# Patient Record
Sex: MALE | Race: WHITE | NOT HISPANIC OR LATINO | Employment: OTHER | URBAN - METROPOLITAN AREA
[De-identification: names, ages, dates, MRNs, and addresses within clinical notes are randomized per-mention and may not be internally consistent; named-entity substitution may affect disease eponyms.]

---

## 2019-03-26 PROBLEM — R31.29 MICROHEMATURIA: Status: ACTIVE | Noted: 2019-03-26

## 2019-03-26 PROBLEM — N20.1 URETERAL STONE: Status: ACTIVE | Noted: 2019-03-26

## 2019-04-02 PROBLEM — Z46.6 FITTING AND ADJUSTMENT OF URINARY DEVICE: Status: ACTIVE | Noted: 2019-04-02

## 2019-04-02 PROBLEM — Z87.442 PERSONAL HISTORY OF URINARY CALCULI: Status: ACTIVE | Noted: 2019-04-02

## 2019-05-18 ENCOUNTER — OFFICE VISIT (OUTPATIENT)
Dept: URGENT CARE | Facility: CLINIC | Age: 74
End: 2019-05-18
Payer: MEDICARE

## 2019-05-18 VITALS
BODY MASS INDEX: 23.95 KG/M2 | OXYGEN SATURATION: 97 % | HEART RATE: 57 BPM | WEIGHT: 158 LBS | DIASTOLIC BLOOD PRESSURE: 89 MMHG | HEIGHT: 68 IN | SYSTOLIC BLOOD PRESSURE: 154 MMHG | TEMPERATURE: 97 F

## 2019-05-18 DIAGNOSIS — J02.9 ACUTE PHARYNGITIS, UNSPECIFIED ETIOLOGY: Primary | ICD-10-CM

## 2019-05-18 PROCEDURE — 96372 THER/PROPH/DIAG INJ SC/IM: CPT | Mod: S$GLB,,, | Performed by: FAMILY MEDICINE

## 2019-05-18 PROCEDURE — 99203 OFFICE O/P NEW LOW 30 MIN: CPT | Mod: 25,S$GLB,, | Performed by: FAMILY MEDICINE

## 2019-05-18 PROCEDURE — 99203 PR OFFICE/OUTPT VISIT, NEW, LEVL III, 30-44 MIN: ICD-10-PCS | Mod: 25,S$GLB,, | Performed by: FAMILY MEDICINE

## 2019-05-18 PROCEDURE — 96372 PR INJECTION,THERAP/PROPH/DIAG2ST, IM OR SUBCUT: ICD-10-PCS | Mod: S$GLB,,, | Performed by: FAMILY MEDICINE

## 2019-05-18 RX ORDER — CEFDINIR 300 MG/1
300 CAPSULE ORAL 2 TIMES DAILY
Qty: 20 CAPSULE | Refills: 0 | Status: SHIPPED | OUTPATIENT
Start: 2019-05-18 | End: 2019-05-28

## 2019-05-18 RX ORDER — DEXAMETHASONE SODIUM PHOSPHATE 100 MG/10ML
5 INJECTION INTRAMUSCULAR; INTRAVENOUS
Status: COMPLETED | OUTPATIENT
Start: 2019-05-18 | End: 2019-05-18

## 2019-05-18 RX ADMIN — DEXAMETHASONE SODIUM PHOSPHATE 5 MG: 100 INJECTION INTRAMUSCULAR; INTRAVENOUS at 06:05

## 2019-05-18 NOTE — PATIENT INSTRUCTIONS
You have requested an injection of steroid today.  You have Diabetes Mellitus.  I informed you that the injection can and probably will raise your blood sugar.  You stated that you have had this type of injection before without problems and requested the injection.  You should monitor your blood sugar closely (at least 2 - 3 times per day) for the next few days.    Please drink plenty of fluids.  Please get plenty of rest.  Please return here or go to the Emergency Department for any concerns or worsening of condition.  If you were given wait & see antibiotics, please wait 3-5 days before taking them, and only take them if your symptoms have worsened or not improved.  If you do begin taking the antibiotics, please take them to completion.  If you were prescribed antibiotics, please take them to completion.    If you were prescribed a narcotic medication, do not drive or operate heavy equipment or machinery while taking these medications.    You were given a decongestant (RESCON or POLY VENT Dm).  If your insurance does not cover it or you cannot afford it, it is ok to use the over the counter products listed below.  If you do not have Hypertension or any history of palpitations, it is ok to take over the counter Sudafed or Mucinex D or Allegra-D or Claritin-D or Zyrtec-D.  If you do take one of the above, it is ok to combine that with plain over the counter Mucinex or Allegra or Claritin or Zyrtec.  If for example you are taking Zyrtec -D, you can combine that with Mucinex, but not Mucinex-D.  If you are taking Mucinex-D, you can combine that with plain Allegra or Claritin or Zyrtec.   If you do have Hypertension or palpitations, it is safe to take Coricidin HBP for relief of sinus symptoms.    We recommend you take over the counter Flonase (Fluticasone) or another nasally inhaled steroid unless you are already taking one.      Nasal irrigation with a saline spray or Netti Pot like device per their directions is  also recommended.  If not allergic, please take over the counter Tylenol (Acetaminophen)  as directed for control of pain and/or fever.  You may take Sudafed every 6 hours as directed for congestion.    You were given an injection of steroid.  This will relieve swelling and inflammation and improve respiratory symptoms.  It can raise your blood sugar if you are diabetic.    Please follow up with your primary care doctor or specialist as needed.    Ke Davidson MD  [phone]    If you  smoke, please stop smoking.         Pharyngitis: Strep (Presumed)    You have pharyngitis (sore throat). The cause is thought to be the streptococcus, or strep, bacterium. Strep throat infection can cause throat pain that is worse when swallowing, aching all over, headache, and fever. The infection may be spread by coughing, kissing, or touching others after touching your mouth or nose. Antibiotic medications are given to treat the infection.  Home care  · Rest at home. Drink plenty of fluids to avoid dehydration.  · No work or school for the first 2 days of taking the antibiotics. After this time, you will not be contagious. You can then return to work or school if you are feeling better.   · The antibiotic medication must be taken for the full 10 days, even if you feel better. This is very important to ensure the infection is treated. It is also important to prevent drug-resistant organisms from developing. If you were given an antibiotic shot, no more antibiotics are needed.  · You may use acetaminophen or ibuprofen to control pain or fever, unless another medicine was prescribed for this. If you have chronic liver or kidney disease or ever had a stomach ulcer or GI bleeding, talk with your doctor before using these medicines.  · Throat lozenges or a throat-numbing sprays can help reduce throat pain. Gargling with warm salt water can also help. Dissolve 1/2 teaspoon of salt in 1 8 ounce glass of warm water.   · Avoid salty or  spicy foods, which can irritate the throat.  Follow-up care  Follow up with your healthcare provider or our staff if you are not improving over the next week.  When to seek medical advice  Call your healthcare provider right away if any of these occur:  · Fever as directed by your doctor.   · New or worsening ear pain, sinus pain, or headache  · Painful lumps in the back of neck  · Stiff neck  · Lymph nodes are getting larger  · Inability to swallow liquids, excessive drooling, or inability to open mouth wide due to throat pain  · Signs of dehydration (very dark urine or no urine, sunken eyes, dizziness)  · Trouble breathing or noisy breathing  · Muffled voice  · New rash  Date Last Reviewed: 4/13/2015  © 3825-1606 [employer]. [address] [place] [address], [place] [address]. All rights reserved. This information is not intended as a substitute for professional medical care. Always follow your healthcare professional's instructions.

## 2019-05-18 NOTE — PROGRESS NOTES
"Subjective:       Patient ID: Rush Jones is a 74 y.o. male.    Vitals:  height is 5' 8" (1.727 m) and weight is 71.7 kg (158 lb). His oral temperature is 97 °F (36.1 °C). His blood pressure is 154/89 (abnormal) and his pulse is 57 (abnormal). His oxygen saturation is 97%.     Chief Complaint: Sore Throat    Sore Throat    This is a new problem. The current episode started yesterday. The problem has been gradually worsening. There has been no fever. Associated symptoms include coughing and trouble swallowing. Pertinent negatives include no congestion, diarrhea, ear pain, headaches, shortness of breath or vomiting. He has had no exposure to strep or mono. He has tried nothing for the symptoms.       Constitution: Negative for chills, sweating, fatigue and fever.   HENT: Positive for sore throat and trouble swallowing. Negative for ear pain and congestion.    Neck: Negative for painful lymph nodes.   Cardiovascular: Negative for chest pain and leg swelling.   Eyes: Negative for double vision and blurred vision.   Respiratory: Positive for cough. Negative for shortness of breath.    Gastrointestinal: Negative for nausea, vomiting and diarrhea.   Genitourinary: Negative for dysuria, frequency and urgency.   Musculoskeletal: Negative for joint pain, joint swelling, muscle cramps and muscle ache.   Skin: Negative for color change, pale and rash.   Allergic/Immunologic: Negative for seasonal allergies.   Neurological: Negative for dizziness, history of vertigo, light-headedness, passing out and headaches.   Hematologic/Lymphatic: Negative for swollen lymph nodes, easy bruising/bleeding and history of blood clots. Does not bruise/bleed easily.   Psychiatric/Behavioral: Negative for nervous/anxious, sleep disturbance and depression. The patient is not nervous/anxious.        Objective:      Physical Exam   Constitutional: He is oriented to person, place, and time. He appears well-developed and well-nourished. He is " cooperative.  Non-toxic appearance. He does not appear ill. No distress.   HENT:   Head: Normocephalic and atraumatic.   Right Ear: Hearing, tympanic membrane, external ear and ear canal normal.   Left Ear: Hearing, tympanic membrane, external ear and ear canal normal.   Nose: Nose normal. No mucosal edema, rhinorrhea or nasal deformity. No epistaxis. Right sinus exhibits no maxillary sinus tenderness and no frontal sinus tenderness. Left sinus exhibits no maxillary sinus tenderness and no frontal sinus tenderness.   Mouth/Throat: Uvula is midline and mucous membranes are normal. No trismus in the jaw. Normal dentition. No uvula swelling. Posterior oropharyngeal edema and posterior oropharyngeal erythema present.   Eyes: Conjunctivae and lids are normal. No scleral icterus.   Neck: Trachea normal, full passive range of motion without pain and phonation normal. Neck supple.   Cardiovascular: Normal rate, regular rhythm, normal heart sounds, intact distal pulses and normal pulses.   Pulmonary/Chest: Effort normal and breath sounds normal. No respiratory distress.   Abdominal: Soft. Normal appearance and bowel sounds are normal. He exhibits no distension. There is no tenderness.   Musculoskeletal: Normal range of motion. He exhibits no edema or deformity.   Neurological: He is alert and oriented to person, place, and time. He exhibits normal muscle tone. Coordination normal.   Skin: Skin is warm, dry and intact. He is not diaphoretic. No pallor.   Psychiatric: He has a normal mood and affect. His speech is normal and behavior is normal. Judgment and thought content normal. Cognition and memory are normal.   Nursing note and vitals reviewed.      Assessment:       1. Acute pharyngitis, unspecified etiology        Plan:         Acute pharyngitis, unspecified etiology  -     cefdinir (OMNICEF) 300 MG capsule; Take 1 capsule (300 mg total) by mouth 2 (two) times daily. for 10 days  Dispense: 20 capsule; Refill: 0  -      dexchlorpheniramin-pseudoephed (RESCON) 2-60 mg Tab; Take 1 tablet by mouth 2 (two) times daily as needed.  Dispense: 20 tablet; Refill: 0  -     dexamethasone injection 5 mg    You have requested an injection of steroid today.  You have Diabetes Mellitus.  I informed you that the injection can and probably will raise your blood sugar.  You stated that you have had this type of injection before without problems and requested the injection.  You should monitor your blood sugar closely (at least 2 - 3 times per day) for the next few days.      Please drink plenty of fluids.  Please get plenty of rest.  Please return here or go to the Emergency Department for any concerns or worsening of condition.  If you were given wait & see antibiotics, please wait 3-5 days before taking them, and only take them if your symptoms have worsened or not improved.  If you do begin taking the antibiotics, please take them to completion.  If you were prescribed antibiotics, please take them to completion.  If you were prescribed a narcotic medication, do not drive or operate heavy equipment or machinery while taking these medications.    You were given a decongestant (RESCON or POLY VENT Dm).  If your insurance does not cover it or you cannot afford it, it is ok to use the over the counter products listed below.  If you do not have Hypertension or any history of palpitations, it is ok to take over the counter Sudafed or Mucinex D or Allegra-D or Claritin-D or Zyrtec-D.  If you do take one of the above, it is ok to combine that with plain over the counter Mucinex or Allegra or Claritin or Zyrtec.  If for example you are taking Zyrtec -D, you can combine that with Mucinex, but not Mucinex-D.  If you are taking Mucinex-D, you can combine that with plain Allegra or Claritin or Zyrtec.   If you do have Hypertension or palpitations, it is safe to take Coricidin HBP for relief of sinus symptoms.    We recommend you take over the counter Flonase  (Fluticasone) or another nasally inhaled steroid unless you are already taking one.  Nasal irrigation with a saline spray or Netti Pot like device per their directions is also recommended.  If not allergic, please take over the counter Tylenol (Acetaminophen) and/or Motrin (Ibuprofen) as directed for control of pain and/or fever.    Robitussin DM 2 teas every 4 hours as needed for cough.  If you  smoke, please stop smoking.    You were given an injection of steroid.  This will relieve swelling and inflammation and improve respiratory symptoms.  It can raise your blood sugar if you are diabetic.    Please follow up with your primary care doctor or specialist as needed.  Ke Davidson MD  [phone]

## 2020-02-15 PROBLEM — I10 ESSENTIAL HYPERTENSION: Status: ACTIVE | Noted: 2020-02-15

## 2020-02-15 PROBLEM — E11.9 TYPE 2 DIABETES MELLITUS WITHOUT COMPLICATION, WITHOUT LONG-TERM CURRENT USE OF INSULIN: Status: ACTIVE | Noted: 2020-02-15

## 2020-02-15 PROBLEM — E78.5 HYPERLIPIDEMIA: Status: ACTIVE | Noted: 2020-02-15

## 2020-02-15 PROBLEM — R07.9 CHEST PAIN: Status: ACTIVE | Noted: 2020-02-15

## 2020-02-15 PROBLEM — I25.119 CORONARY ARTERY DISEASE INVOLVING NATIVE CORONARY ARTERY OF NATIVE HEART WITH ANGINA PECTORIS: Status: ACTIVE | Noted: 2020-02-15

## 2020-02-16 PROBLEM — R07.9 CHEST PAIN: Status: RESOLVED | Noted: 2020-02-15 | Resolved: 2020-02-16

## 2020-03-11 PROBLEM — I48.91 NEW ONSET ATRIAL FIBRILLATION: Status: ACTIVE | Noted: 2020-03-11

## 2020-04-30 PROBLEM — R31.0 GROSS HEMATURIA: Status: ACTIVE | Noted: 2020-04-30

## 2021-08-18 PROBLEM — Z86.010 PERSONAL HISTORY OF COLONIC POLYPS: Status: ACTIVE | Noted: 2021-08-18

## 2021-08-20 PROBLEM — S36.09XA SPLENIC RUPTURE: Status: ACTIVE | Noted: 2021-08-20

## 2021-08-25 PROBLEM — K92.1 BLACK TARRY STOOLS: Status: ACTIVE | Noted: 2021-08-25

## 2023-10-04 PROBLEM — N32.89 BLADDER MASS: Status: ACTIVE | Noted: 2023-10-04

## 2024-02-02 PROBLEM — I48.0 PAF (PAROXYSMAL ATRIAL FIBRILLATION): Status: ACTIVE | Noted: 2020-03-11

## 2024-03-05 PROBLEM — Z95.818 PRESENCE OF WATCHMAN LEFT ATRIAL APPENDAGE CLOSURE DEVICE: Chronic | Status: ACTIVE | Noted: 2024-03-05
